# Patient Record
Sex: MALE | Race: BLACK OR AFRICAN AMERICAN
[De-identification: names, ages, dates, MRNs, and addresses within clinical notes are randomized per-mention and may not be internally consistent; named-entity substitution may affect disease eponyms.]

---

## 2018-07-01 NOTE — OP
PREOPERATIVE DIAGNOSES:  Right submandibular abscess, right sublingual abscess, necrotic tooth #32.

 

PROCEDURE PERFORMED:  Surgical removal of tooth #32, incision and drainage of right submandibular and
 sublingual abscess.

 

SPECIMENS REMOVED:  None.

 

ESTIMATED BLOOD LOSS:  15-20 mL.

 

URINE OUTPUT:  Not recorded.

 

DISPOSITION:  The patient tolerated the procedure well.

 

BRIEF PATIENT HISTORY AND PROCEDURE IN DETAIL:  This is a 33-year-old male with a history of right lo
wer jaw pain and tooth pain and swelling, taken to the OR, prepped and draped in the sterile fashion.
  A throat pack was placed, mouth irrigated with Peridex.  Hockey stick incision over bone was made o
johnna a necrotic tooth #32.  This tooth crown was fractured off and tissue was grown over the top of th
e tooth.  A full thickness mucoperiosteal flap to the buccal was made.  A small amount of buccal oste
ctomy was done with the drill followed by sectioning of the tooth with the drill, elevator removal of
 the tooth.  I curetted the socket, normal saline irrigation of the socket.  A lingual degloving full
 thickness mucoperiosteal flap was laid to the lingual of the mandible, into the sublingual space and
 down the submandibular space.  Mild amount of purulent type exudate was expressed.  Area was irrigat
ed thoroughly.  A quarter-inch Penrose drain was placed.  Area left open to drain.

 

ANESTHESIA:  General endotracheal anesthesia through oral tube.  The patient was extubated stable and
 transferred to the postop recovery unit.

## 2018-07-01 NOTE — CON
DATE OF CONSULTATION:  07/01/2018

 

REASON FOR CONSULTATION:  Jaw pain and swelling.

 

HISTORY OF PRESENT ILLNESS:  This is a 33-year-old black male with approximately 2-week history of a 
right lower jaw pain and swelling, has been in the ER multiple times, recently on penicillin for this
 right lower jaw pain and swelling.  For this, I was consulted.  The patient does have a little bit o
f difficulty swallowing and pain in his right jaw.  No difficulty breathing or maintaining his airway
 or holding secretions.  He has no numbness of his lip, chin or tongue.  He does complain of pain in 
the right submandibular and submental region and pain from a right posterior mandibular molar.

 

PAST MEDICAL HISTORY:  The patient has a history of psychiatric disorder, unspecified.

 

PAST SURGICAL HISTORY:  Bowel resection as a child.

 

SOCIAL HISTORY:  Denies alcohol, tobacco and drugs.

 

ALLERGIES:  To RITALIN.

 

MEDICATIONS:  The patient states he takes no current home meds.

 

PHYSICAL EXAMINATION:

VITAL SIGNS:  The patient's vital signs are currently stable.  He is afebrile, he is satting 96% on r
oom air.

GENERAL:  He is awake, alert, oriented x3, is in no acute distress.

NECK:  He does have a rather obese neck with some swelling in the right submandibular and submental a
gaston as well as some swelling in the floor of the mouth on the right side.

HEENT:  He has a grossly decayed tooth #32, which is tender to palpation.  His oral opening is good. 
 His oropharynx is visualized.  His floor of mouth is slightly elevated, but is fairly soft, nonfluct
uant as well as submandibular and submental area; however, it is very tender to palpation.

 

A CT scan of the neck with IV contrast shows a possible enhancing abscess lesion in the right subling
ual area with attenuation of the right submental area as well.  I cannot for certain say this the act
ual fluid collection or just inflamed tissue, there is also noted a necrotic tooth #32 with a periapi
talib radiolucency with cortical perforation of that area.

 

ASSESSMENT:  This is a 33-year-old male with right submandibular, submental cellulitis, possible absc
ess from necrotic tooth #32 with periapical abscess.

 

PLAN:  Will be keep the patient on IV antibiotics, clindamycin 900 mg IV q.6 hours is fine for now.  
The patient has also been given meropenem in the ER.  We will take the patient to the OR for drainage
 of the above noted abscess, right submandibular and sublingual area and surgical removal and debride
ment of socket #32.  We will follow the patient closely.

## 2018-07-01 NOTE — CT
CT FACE WITH CONTRAST:

 

INDICATIONS:

Swelling.  Low mandible with difficulty breathing and throat swelling.

 

TECHNIQUE:

Multiple axial tomograms obtained through the face, to the mid neck region.

 

FINDINGS:

The facial bones show no evidence of nasal or orbital abnormality.  The paranasal sinuses are well ae
rated with no mucosal disease.  The maxilla, zygoma, and mandible appear unremarkable.  The mastoid a
nd petrous air cells are well aerated.  The soft tissues show unremarkable parotid and submandibular 
glands.

 

Nasopharynx unremarkable.

 

Waldeyer's ring and oropharynx are unremarkable.

 

Review of the base of the tongue reveals abnormal low attenuation involving the base of the tongue on
 the right, in the region of the geniohyoid musculature, just anterior to the mylohyoid.  This has so
mewhat of a linear appearance, measuring up to 2.2 cm in AP dimension in the axial plane x approximat
ely 0.7 cm in width.  There is extension, however, to the lingual side of the mandible, and there is 
evidence of erosion to the cortex of the mandible, involving the third molar.  There is a large attila
 involving this molar, and there is evidence of abnormal cystic change involving the roots.  This jacobson
ses the possibility of a periapical abscess with lingular extension into the floor of the mouth.  Oth
er considerations include a dilated Ivel's duct, although the dental etiology is favored.  There i
s no evidence of a Cassia's duct stone, and the right submandibular gland does not appear inflamed.

 

Nonspecific lymph nodes in the cervical chains bilaterally with prominent level II lymph nodes bilate
rally, and there are mildly prominent level I lymph nodes, measuring up to 1 cm.

 

IMPRESSION:

Abnormal area of low attenuation in the sublingual region on the right with some surrounding enhancem
ent.  There is cortical erosion involving the lingual side of the mandible at this site, and there is
 an abnormality involving the third molar.  A periapical abscess with lingular extension into the luba
or of the mouth is suspected.  Recommend either a ENT or oral surgical consultation.

 

POS: [facility]

## 2018-07-01 NOTE — HP
DATE OF SERVICE:  2018

 

CHIEF COMPLAINT:  "Woke up with swelling."

 

PRIMARY CARE PROVIDER:  None.

 

HISTORY OF PRESENT ILLNESS:  Mr. Tracey is a 33-year-old male, who presents to 
the emergency department with worsening pain and swelling in his jaw and neck.  
He was seen in the emergency room twice over the past few days.  On the first 
visit he was treated with toradol, oral penicillin, peridex and ibuprofen.  On 
the second visit yesterday he was diagnosed with acute pharyngitis, and given 
an injection of Bicillin and 8 mg of oral dexamethasone.  He returns today with 
worsening both of the swelling in his jaw and neck, fevers.  He reports he is 
unable to eat or drink fluids because of this.  He denies any precipitants or 
relieving factors, denies any recent dental work, states the last time was when 
he was in custodial back in .  

 

In the emergency room, the patient diagnosed with Carlos's angina, given 
meropenem 1 gram IV, clindamycin 900 mg IV, Decadron 10 mg IV and 1 liter of 
normal saline and OMFS called for evaluation and surgery and Hospitalist called 
for admission.

 

PAST MEDICAL HISTORY:  Strabismus of his right eye, history of a prior 
admission in  for facial swelling and dental pain with a diagnosis of oral 
abscess.

 

PAST SURGICAL HISTORY:  He reports some type of abdominal surgery as an infant.

 

SOCIAL HISTORY:  He denies any tobacco, alcohol, or drugs.  He lives locally 
with his girlfriend.  He reports his surrogate decision maker is his aunt 
Melissa Faulkner.

 

FAMILY HISTORY:  Significant for mom who passed away of cirrhosis.

 

REVIEW OF SYSTEMS:  He denies any problems with his vision, nausea, vomiting, 
chest pain or shortness of breath.  All remaining review of systems are 
reviewed and negative.

 

ALLERGIES TO MEDICATIONS:  RITALIN.

 

CURRENT MEDICATIONS:  As prescribed on 2018,

1.  Pen-Vee K 500 mg 4 times daily.

2.  Peridex 4 times daily.

3.  Ibuprofen 800 mg every 8 hours as needed.

 

PHYSICAL EXAMINATION:

VITAL SIGNS:  Blood pressure 143/86, pulse 81, respirations 32, although the 
patient does not appear tachypneic.  Temperature 99.2, saturation 94% on room 
air.

GENERAL:  Awake, alert, responsive, in no apparent distress, although he 
speaking with a muffled voice.

HEENT:  Right strabismus laterally.  Pupils are equal and round.  The patient 
is unable to open his mouth very wide.  He has edema in the submandibular area 
on the right side with tenderness to palpation and palpable fullness.

LYMPHATICS:  No palpable lymphadenopathy, although unable to completely assess 
in the right anterior cervical area.  No bilateral supraclavicular 
lymphadenopathy.

LUNGS:  Clear to auscultation bilateral.  No audible wheezing, rhonchi or rales.

HEART:  Normal S1, S2, regular rate and rhythm, no audible murmurs.

ABDOMEN:  Soft.  Present bowel sounds.  Nontender, nondistended.

EXTREMITIES:  No clubbing, cyanosis, or edema.

SKIN:  No visible rashes.

 

LABORATORY DATA AND IMAGIN.  CBC:  11.9, 13,6, 40.1, 216.

2.  Chemistry:  141, 3.5, 104, 27, 13, 0.9, 88.

3.  LFTs are normal.

4.  The patient had a facial bone CT and the results are not yet available.

 

IMPRESSION:  

1.  Carlos's angina.

 2.  Strabismus - asymptomatic



PLAN:

1.  Evaluation by OMFS with intention for the operating room for a definitive 
treatment and cultures.

2.  I discussed with OMFS antibiotics, we will continue the clindamycin at 900 
mg every 6 hours.  Cultures to be obtained in the OR.

3.  Manage pain and continue IV fluids for hydration.

4.  Anticipated length of stay is at least 2 midnights for IV antibiotics, 
determination of antibiotic therapy to transition to, and any further needs.

5.  DVT prophylaxis with pneumatic compression devices, anticipate patient will 
be ambulatory.

6.  Gastrointestinal prophylaxis not indicated.

7.  Code status is FULL.  Surrogate decision maker is the patient's aunt.

 

I reviewed the plan of care with the patient.  No questions or further needs at 
end of evaluation.

 

[facility]

## 2018-07-02 NOTE — PDOC.PN
- Subjective


Encounter Start Date: 07/02/18


Encounter Start Time: 22:10


Subjective: f/u for R sublingual abscess s/p I&D with extraction of tooth #32


-: Feels sore but overall better. Tx with IV Clindamycin currently.





- Objective


MAR Reviewed: Yes


Vital Signs & Weight: 


 Vital Signs (12 hours)











  Temp Pulse Resp BP Pulse Ox


 


 07/02/18 19:45  98.3 F  82  16  131/81  94 L


 


 07/02/18 15:10  97.5 F L  80  18  130/80  98


 


 07/02/18 12:00  97.8 F  69  18  130/85  97








 Weight











Weight                         290 lb














I&O: 


 











 07/01/18 07/02/18 07/03/18





 06:59 06:59 06:59


 


Intake Total  1840 836


 


Output Total  950 


 


Balance  890 836











Result Diagrams: 


 07/02/18 05:08





 07/02/18 05:08


Additional Labs: 





Microbiology





07/08/15 08:26   Throat - Pending   Group A Streptococcus Screen ([facility]) - Final


06/30/18 16:33   Throat - Pending   Group A Streptococcus Culture - Final


06/30/18 16:24   Tonsil - Pending   Group A Streptococcus Screen ([facility]) - Final


07/01/18 13:49   Venous blood - Left Hand   Blood Culture - Preliminary


                              Specimen has been received and culture in 

progress.


                              No Growth to date.


07/01/18 13:30   Venous blood - Left Arm   Blood Culture - Preliminary


                              Specimen has been received and culture in 

progress.


                              No Growth to date.





 Laboratory Tests











  07/01/18 07/02/18





  13:29 05:08


 


WBC  11.9 H 


 


Neutrophils %   86.6 H


 


Neutrophils % (Manual)  77 H 











Radiology Reviewed by me: Yes (CT neck - R sublingual abscess and periapical 

abscess with extension)





Phys Exam





- Physical Examination


Constitutional: NAD


Edema and TTP of R submandibular region, anterior R cervical area


HEENT: PERRLA, sclera anicteric


+ cervical adenopathy


Neck: no JVD, full ROM


Respiratory: no wheezing, no rales, no rhonchi, clear to auscultation bilateral


S1, S2


Cardiovascular: RRR, no significant murmur, no rub, gallop


Gastrointestinal: soft, non-tender, no distention, positive bowel sounds


Musculoskeletal: no edema, pulses present


Neurological: non-focal, normal sensation, moves all 4 limbs


Psychiatric: normal affect, A&O x 3


Skin: no rash, normal turgor, cap refill <2 seconds





Dx/Plan


(1) Sublingual abscess


Code(s): K12.2 - CELLULITIS AND ABSCESS OF MOUTH   Status: Acute   Comment: s/p 

I&D of abscess POD #1, continue Clindamycin 900mg IV q6h, Chlorhexidine SSP, 

pain control prn   





(2) Periapical abscess


Code(s): K04.7 - PERIAPICAL ABSCESS WITHOUT SINUS   Status: Acute   Comment: s/

p #32 extraction, see above in #1   





(3) Neutrophilic leukocytosis


Code(s): D72.9 - DISORDER OF WHITE BLOOD CELLS, UNSPECIFIED   Status: Acute   

Comment: Serial CBC monitoring   





- Plan


plan discussed w/ family, continue antibiotics, out of bed/ambulate, DVT proph w

/SCDs


Stable overall


-: Continue Clindamycin 900mg IV q6h 


-: Continue Morphine Sulfate 2mg-4mg IV q4h prn pain


-: Chlorhexidine SSP


-: AM lab: BMP, CBC





* .

## 2018-07-03 NOTE — PRG
DATE OF SERVICE:  07/02/2018

 

TIME:  06:30 p.m.

 

SUBJECTIVE:  No acute 24-hour events.  The patient is voiding, ambulating, and tolerating p.o.  His p
ain is controlled.  No dysphagia.  No difficulty breathing.

 

OBJECTIVE:

VITAL SIGNS:  Stable.  He is afebrile, T-max is 98.7.

GENERAL:  He is awake, alert, oriented x3.  He is in no acute distress.

HEENT:  His oral opening is good.  His oropharynx is clear.  There is no uvular deviation.  The floor
 of mouth is soft.

NECK:  Soft.  There is decreased swelling of the right submandibular and submental areas.  The drain 
Penrose 1/2 inch was removed from the lingual of the mandible today.

 

LABORATORY DATA:  White blood cell count today is 12.4.  This is being dictated on 07/03/2018 where t
he white count is 8.2.

 

ASSESSMENT:  The patient is doing well, status post I and D of a dental abscess.

 

PLAN:  Discharge the patient home on p.o. clindamycin 300 mg p.o. q.6 hours x5 days, Peridex 15 mL sw
sharona and spit t.i.d., and Tylenol #3 for pain.

## 2018-07-04 NOTE — DIS
DATE OF ADMISSION:  07/01/2018

 

DATE OF DISCHARGE:  07/03/2018

 

DISCHARGE DIAGNOSES:

1.  Sublingual abscess status post incision and drainage on 07/01/2018.

2.  Periapical abscess of tooth #32, status post extraction.

3.  Neutrophilic leukocytosis improved.

 

CONSULTATIONS:  Dr. Humphrey with Oral Maxillofacial Surgery Service.

 

PERTINENT LABORATORY AND X-RAY FINDINGS:  Complete metabolic profile within normal limits.  CBC showe
d a white blood cell count ranged between 8.2 to 12.4, hemoglobin ranged between 12.3 to 13.6.  Blood
 cultures x2 from 07/01/2018 showed no growth at 48 hours.  CT of the facial bone dated 07/01/2018 sh
owed cortical erosion of the lingual side of the mandible the sublingual region on the right.  Abnorm
ality involving the third molar noted with concern for periapical abscess.

 

HOSPITAL COURSE:  The patient was initially admitted after presenting with jaw and neck pain in the c
ontext of abscess in the sublingual region noted on CT imaging of the face and neck.  The patient was
 initially treated with IV meropenem, clindamycin, and Decadron and 1 liter of normal saline.  The jeanette killian underwent evaluation by the Oral Maxillofacial Surgery Service and taken for incision and drain
age of the right submandibular and sublingual abscess as well as extraction of tooth #32.  The patien
t continued on IV clindamycin throughout the hospital course; however, postoperatively remained clini
adalgisa stable.  The patient received Peridex swish and spit as well as ibuprofen and morphine sulfate 
for pain control.  The patient overall remained clinically stable throughout the hospital course and 
I have discussed followup instructions with the patient who verbalized understanding and agreement.  
The patient overall clinically stable and ready for discharge on 07/03/2018.

 

DISCHARGE MEDICATIONS:

1.  Clindamycin 300 mg 1 tab p.o. q.6 hours x5 days.

2.  Peridex 15 mL swish and spit t.i.d.

3.  Tylenol No. 3 one to two tabs p.o. q.4-6 hours p.r.n.

 

FOLLOWUP:  The patient will follow up with Dr. Rosales Humphrey with Oral Maxillofacial Surgery [facility].

 

CONDITION ON DISCHARGE:  Stable.

 

ACTIVITY:  Ad shameka.

 

DIET:  Regular.

 

CODE STATUS:  FULL.

 

DISPOSITION:  Home on 07/03/2018.

## 2020-03-03 NOTE — RAD
THREE VIEWS OF THE LEFT SHOULDER:

3/3/20

 

COMPARISON: 

None.

 

HISTORY: 

Injury, trauma, pain. 

 

FINDINGS: 

There is no widening of the acromioclavicular or the coracoclavicular interspace. No displaced fractu
re or evidence of dislocation is appreciate. 

 

There is a focal area of linear calcification adjacent to the greater tuberosity on image 1 of 3 suad
uring 7 mm, which may be on the basis of calcific tendinosis. 

 

IMPRESSION: 

No displaced fracture or evidence of dislocation. Question calcific tendinosis. 

 

POS: [facility]

## 2021-02-21 NOTE — RAD
Portable frontal chest radiograph:

2/21/2021



COMPARISON: 6/13/2015



HISTORY: Fever and chills, cough



FINDINGS: There are patchy areas of interstitial and groundglass opacity noted in the left perihilar 
region and left suprahilar region as well as within the medial right lung base. There is no

pneumothorax or pleural fluid and no focal consolidation or alveolar edema.



IMPRESSION: Patchy areas of linear interstitial density and groundglass opacity. Findings may signify
 Covid pneumonia in the proper clinical setting.



Reported By: Gregory Helton 

Electronically Signed:  2/21/2021 4:47 PM

## 2021-08-04 ENCOUNTER — HOSPITAL ENCOUNTER (EMERGENCY)
Dept: HOSPITAL 92 - ERS | Age: 36
Discharge: HOME | End: 2021-08-04
Payer: COMMERCIAL

## 2021-08-04 DIAGNOSIS — E78.5: ICD-10-CM

## 2021-08-04 DIAGNOSIS — K59.00: Primary | ICD-10-CM

## 2021-08-04 DIAGNOSIS — I10: ICD-10-CM

## 2021-08-04 LAB
ALBUMIN SERPL BCG-MCNC: 4.3 G/DL (ref 3.5–5)
ALP SERPL-CCNC: 73 U/L (ref 40–110)
ALT SERPL W P-5'-P-CCNC: 27 U/L (ref 8–55)
ANION GAP SERPL CALC-SCNC: 13 MMOL/L (ref 10–20)
AST SERPL-CCNC: 18 U/L (ref 5–34)
BASOPHILS # BLD AUTO: 0 THOU/UL (ref 0–0.2)
BASOPHILS NFR BLD AUTO: 0.3 % (ref 0–1)
BILIRUB SERPL-MCNC: 0.5 MG/DL (ref 0.2–1.2)
BUN SERPL-MCNC: 8 MG/DL (ref 8.9–20.6)
CALCIUM SERPL-MCNC: 9.5 MG/DL (ref 7.8–10.44)
CHLORIDE SERPL-SCNC: 102 MMOL/L (ref 98–107)
CO2 SERPL-SCNC: 26 MMOL/L (ref 22–29)
CREAT CL PREDICTED SERPL C-G-VRATE: 0 ML/MIN (ref 70–130)
EOSINOPHIL # BLD AUTO: 0 THOU/UL (ref 0–0.7)
EOSINOPHIL NFR BLD AUTO: 0.5 % (ref 0–10)
GLOBULIN SER CALC-MCNC: 3.7 G/DL (ref 2.4–3.5)
GLUCOSE SERPL-MCNC: 83 MG/DL (ref 70–105)
HGB BLD-MCNC: 13.6 G/DL (ref 14–18)
LIPASE SERPL-CCNC: 12 U/L (ref 8–78)
LYMPHOCYTES # BLD: 1.6 THOU/UL (ref 1.2–3.4)
LYMPHOCYTES NFR BLD AUTO: 19.1 % (ref 21–51)
MCH RBC QN AUTO: 30.4 PG (ref 27–31)
MCV RBC AUTO: 90.8 FL (ref 78–98)
MONOCYTES # BLD AUTO: 0.6 THOU/UL (ref 0.11–0.59)
MONOCYTES NFR BLD AUTO: 6.8 % (ref 0–10)
NEUTROPHILS # BLD AUTO: 6.3 THOU/UL (ref 1.4–6.5)
NEUTROPHILS NFR BLD AUTO: 73.4 % (ref 42–75)
PLATELET # BLD AUTO: 268 THOU/UL (ref 130–400)
POTASSIUM SERPL-SCNC: 4.2 MMOL/L (ref 3.5–5.1)
RBC # BLD AUTO: 4.46 MILL/UL (ref 4.7–6.1)
SODIUM SERPL-SCNC: 137 MMOL/L (ref 136–145)
SP GR UR STRIP: 1.02 (ref 1–1.04)
WBC # BLD AUTO: 8.5 THOU/UL (ref 4.8–10.8)

## 2021-08-04 PROCEDURE — 74019 RADEX ABDOMEN 2 VIEWS: CPT

## 2021-08-04 PROCEDURE — 83690 ASSAY OF LIPASE: CPT

## 2021-08-04 PROCEDURE — 85025 COMPLETE CBC W/AUTO DIFF WBC: CPT

## 2021-08-04 PROCEDURE — 81003 URINALYSIS AUTO W/O SCOPE: CPT

## 2021-08-04 PROCEDURE — 80053 COMPREHEN METABOLIC PANEL: CPT

## 2021-08-04 PROCEDURE — 36415 COLL VENOUS BLD VENIPUNCTURE: CPT

## 2021-09-30 ENCOUNTER — HOSPITAL ENCOUNTER (EMERGENCY)
Dept: HOSPITAL 92 - ERS | Age: 36
Discharge: HOME | End: 2021-09-30
Payer: COMMERCIAL

## 2021-09-30 DIAGNOSIS — K52.9: Primary | ICD-10-CM

## 2021-09-30 LAB
ALBUMIN SERPL BCG-MCNC: 4.1 G/DL (ref 3.5–5)
ALP SERPL-CCNC: 66 U/L (ref 40–110)
ALT SERPL W P-5'-P-CCNC: 21 U/L (ref 8–55)
ANION GAP SERPL CALC-SCNC: 13 MMOL/L (ref 10–20)
AST SERPL-CCNC: 15 U/L (ref 5–34)
BASOPHILS # BLD AUTO: 0 THOU/UL (ref 0–0.2)
BASOPHILS NFR BLD AUTO: 1 % (ref 0–1)
BILIRUB SERPL-MCNC: 0.5 MG/DL (ref 0.2–1.2)
BUN SERPL-MCNC: 9 MG/DL (ref 8.9–20.6)
CALCIUM SERPL-MCNC: 9.4 MG/DL (ref 7.8–10.44)
CHLORIDE SERPL-SCNC: 108 MMOL/L (ref 98–107)
CO2 SERPL-SCNC: 25 MMOL/L (ref 22–29)
CREAT CL PREDICTED SERPL C-G-VRATE: 0 ML/MIN (ref 70–130)
EOSINOPHIL # BLD AUTO: 0.1 THOU/UL (ref 0–0.7)
EOSINOPHIL NFR BLD AUTO: 1.1 % (ref 0–10)
GLOBULIN SER CALC-MCNC: 3.2 G/DL (ref 2.4–3.5)
GLUCOSE SERPL-MCNC: 103 MG/DL (ref 70–105)
HGB BLD-MCNC: 12.8 G/DL (ref 14–18)
LYMPHOCYTES # BLD: 1.2 THOU/UL (ref 1.2–3.4)
LYMPHOCYTES NFR BLD AUTO: 24.2 % (ref 21–51)
MCH RBC QN AUTO: 29.9 PG (ref 27–31)
MCV RBC AUTO: 91.1 FL (ref 78–98)
MONOCYTES # BLD AUTO: 0.4 THOU/UL (ref 0.11–0.59)
MONOCYTES NFR BLD AUTO: 7.8 % (ref 0–10)
NEUTROPHILS # BLD AUTO: 3.1 THOU/UL (ref 1.4–6.5)
NEUTROPHILS NFR BLD AUTO: 66 % (ref 42–75)
PLATELET # BLD AUTO: 244 THOU/UL (ref 130–400)
POTASSIUM SERPL-SCNC: 4.2 MMOL/L (ref 3.5–5.1)
RBC # BLD AUTO: 4.28 MILL/UL (ref 4.7–6.1)
SODIUM SERPL-SCNC: 142 MMOL/L (ref 136–145)
WBC # BLD AUTO: 4.8 THOU/UL (ref 4.8–10.8)

## 2021-09-30 PROCEDURE — 80053 COMPREHEN METABOLIC PANEL: CPT

## 2021-09-30 PROCEDURE — 36415 COLL VENOUS BLD VENIPUNCTURE: CPT

## 2021-09-30 PROCEDURE — 94760 N-INVAS EAR/PLS OXIMETRY 1: CPT

## 2021-09-30 PROCEDURE — 85025 COMPLETE CBC W/AUTO DIFF WBC: CPT

## 2021-10-23 ENCOUNTER — HOSPITAL ENCOUNTER (EMERGENCY)
Dept: HOSPITAL 92 - ERS | Age: 36
Discharge: HOME | End: 2021-10-23
Payer: COMMERCIAL

## 2021-10-23 DIAGNOSIS — I10: ICD-10-CM

## 2021-10-23 DIAGNOSIS — E78.5: ICD-10-CM

## 2021-10-23 DIAGNOSIS — M25.561: Primary | ICD-10-CM

## 2021-10-23 PROCEDURE — 96372 THER/PROPH/DIAG INJ SC/IM: CPT

## 2022-02-12 ENCOUNTER — HOSPITAL ENCOUNTER (EMERGENCY)
Dept: HOSPITAL 92 - ERS | Age: 37
Discharge: HOME | End: 2022-02-12
Payer: COMMERCIAL

## 2022-02-12 DIAGNOSIS — E78.5: ICD-10-CM

## 2022-02-12 DIAGNOSIS — M62.838: ICD-10-CM
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
## 2022-02-12 DIAGNOSIS — M54.50: ICD-10-CM

## 2022-02-12 DIAGNOSIS — M54.2: ICD-10-CM

## 2022-02-12 DIAGNOSIS — S30.1XXA: Primary | ICD-10-CM

## 2022-02-12 DIAGNOSIS — I10: ICD-10-CM

## 2022-02-12 PROCEDURE — 74177 CT ABD & PELVIS W/CONTRAST: CPT

## 2022-02-12 PROCEDURE — 72125 CT NECK SPINE W/O DYE: CPT

## 2022-02-12 PROCEDURE — 71045 X-RAY EXAM CHEST 1 VIEW: CPT

## 2022-12-04 ENCOUNTER — HOSPITAL ENCOUNTER (EMERGENCY)
Dept: HOSPITAL 92 - ERS | Age: 37
Discharge: HOME | End: 2022-12-04
Payer: COMMERCIAL

## 2022-12-04 DIAGNOSIS — I10: ICD-10-CM

## 2022-12-04 DIAGNOSIS — B34.9: ICD-10-CM

## 2022-12-04 DIAGNOSIS — Z20.822: ICD-10-CM

## 2022-12-04 DIAGNOSIS — E78.5: ICD-10-CM

## 2022-12-04 DIAGNOSIS — H60.92: Primary | ICD-10-CM

## 2022-12-04 PROCEDURE — 99283 EMERGENCY DEPT VISIT LOW MDM: CPT

## 2023-01-22 ENCOUNTER — HOSPITAL ENCOUNTER (EMERGENCY)
Dept: HOSPITAL 92 - ERS | Age: 38
Discharge: HOME | End: 2023-01-22
Payer: COMMERCIAL

## 2023-01-22 DIAGNOSIS — E78.5: ICD-10-CM

## 2023-01-22 DIAGNOSIS — M79.602: Primary | ICD-10-CM

## 2023-01-22 DIAGNOSIS — I10: ICD-10-CM

## 2023-01-22 LAB
ALBUMIN SERPL BCG-MCNC: 4.4 G/DL (ref 3.5–5)
ALP SERPL-CCNC: 62 U/L (ref 40–110)
ALT SERPL W P-5'-P-CCNC: 26 U/L (ref 8–55)
ANION GAP SERPL CALC-SCNC: 15 MMOL/L (ref 10–20)
AST SERPL-CCNC: 17 U/L (ref 5–34)
BASOPHILS # BLD AUTO: 0.1 THOU/UL (ref 0–0.2)
BASOPHILS NFR BLD AUTO: 1 % (ref 0–1)
BILIRUB SERPL-MCNC: 0.3 MG/DL (ref 0.2–1.2)
BUN SERPL-MCNC: 10 MG/DL (ref 8.9–20.6)
CALCIUM SERPL-MCNC: 9.4 MG/DL (ref 7.8–10.44)
CHLORIDE SERPL-SCNC: 108 MMOL/L (ref 98–107)
CO2 SERPL-SCNC: 23 MMOL/L (ref 22–29)
CREAT CL PREDICTED SERPL C-G-VRATE: 0 ML/MIN (ref 70–130)
EOSINOPHIL # BLD AUTO: 0 THOU/UL (ref 0–0.7)
EOSINOPHIL NFR BLD AUTO: 0.8 % (ref 0–10)
GLOBULIN SER CALC-MCNC: 3.6 G/DL (ref 2.4–3.5)
GLUCOSE SERPL-MCNC: 79 MG/DL (ref 70–105)
HGB BLD-MCNC: 14.3 G/DL (ref 14–18)
LYMPHOCYTES # BLD: 1.7 THOU/UL (ref 1.2–3.4)
LYMPHOCYTES NFR BLD AUTO: 31.4 % (ref 21–51)
MCH RBC QN AUTO: 30.9 PG (ref 27–31)
MCV RBC AUTO: 90.2 FL (ref 78–98)
MONOCYTES # BLD AUTO: 0.5 THOU/UL (ref 0.11–0.59)
MONOCYTES NFR BLD AUTO: 8.9 % (ref 0–10)
NEUTROPHILS # BLD AUTO: 3.1 THOU/UL (ref 1.4–6.5)
NEUTROPHILS NFR BLD AUTO: 57.9 % (ref 42–75)
PLATELET # BLD AUTO: 256 10X3/UL (ref 130–400)
POTASSIUM SERPL-SCNC: 4.1 MMOL/L (ref 3.5–5.1)
RBC # BLD AUTO: 4.64 MILL/UL (ref 4.7–6.1)
SODIUM SERPL-SCNC: 142 MMOL/L (ref 136–145)
WBC # BLD AUTO: 5.4 10X3/UL (ref 4.8–10.8)

## 2023-01-22 PROCEDURE — 93005 ELECTROCARDIOGRAM TRACING: CPT

## 2023-01-22 PROCEDURE — 84484 ASSAY OF TROPONIN QUANT: CPT

## 2023-01-22 PROCEDURE — 80053 COMPREHEN METABOLIC PANEL: CPT

## 2023-01-22 PROCEDURE — 70450 CT HEAD/BRAIN W/O DYE: CPT

## 2023-01-22 PROCEDURE — 96372 THER/PROPH/DIAG INJ SC/IM: CPT

## 2023-01-22 PROCEDURE — 85025 COMPLETE CBC W/AUTO DIFF WBC: CPT

## 2023-01-22 PROCEDURE — 36415 COLL VENOUS BLD VENIPUNCTURE: CPT

## 2023-01-22 PROCEDURE — 71045 X-RAY EXAM CHEST 1 VIEW: CPT

## 2024-10-28 ENCOUNTER — HOSPITAL ENCOUNTER (EMERGENCY)
Dept: HOSPITAL 92 - ERS | Age: 39
LOS: 1 days | Discharge: HOME | End: 2024-10-29
Payer: SELF-PAY

## 2024-10-28 DIAGNOSIS — G62.9: Primary | ICD-10-CM

## 2024-10-28 DIAGNOSIS — I10: ICD-10-CM

## 2024-10-28 LAB
ALBUMIN SERPL BCG-MCNC: 3.8 G/DL (ref 3.5–5)
ALP SERPL-CCNC: 66 U/L (ref 40–110)
ALT SERPL W P-5'-P-CCNC: 34 U/L (ref 8–55)
ANION GAP SERPL CALC-SCNC: 13 MMOL/L (ref 10–20)
APAP SERPL-MCNC: (no result) MCG/ML (ref ?–10)
AST SERPL-CCNC: 23 U/L (ref 5–34)
BASOPHILS # BLD AUTO: 0.03 10X3/UL (ref 0–0.2)
BASOPHILS NFR BLD AUTO: 0.8 % (ref 0–1)
BILIRUB SERPL-MCNC: 0.4 MG/DL (ref 0.2–1.2)
BUN SERPL-MCNC: 9 MG/DL (ref 8.9–20.6)
CALCIUM SERPL-MCNC: 9.3 MG/DL (ref 7.8–10.44)
CHLORIDE SERPL-SCNC: 103 MMOL/L (ref 98–107)
CO2 SERPL-SCNC: 25 MMOL/L (ref 22–29)
CREAT CL PREDICTED SERPL C-G-VRATE: 0 ML/MIN (ref 70–130)
EOSINOPHIL # BLD AUTO: 0.1 10X3/UL (ref 0–0.7)
EOSINOPHIL NFR BLD AUTO: 2.3 % (ref 0–10)
GLOBULIN SER CALC-MCNC: 3.6 G/DL (ref 2.4–3.5)
GLUCOSE SERPL-MCNC: 98 MG/DL (ref 70–105)
HCT VFR BLD CALC: 42.1 % (ref 42–52)
HGB BLD-MCNC: 13.9 G/DL (ref 14–18)
LYMPHOCYTES NFR BLD AUTO: 34.4 % (ref 21–51)
MCH RBC QN AUTO: 29.6 PG (ref 27–31)
MCV RBC AUTO: 89.8 FL (ref 78–98)
MONOCYTES # BLD AUTO: 0.3 10X3/UL (ref 0.11–0.59)
MONOCYTES NFR BLD AUTO: 7.6 % (ref 0–10)
NEUTROPHILS # BLD AUTO: 2.2 10X3/UL (ref 1.4–6.5)
NEUTROPHILS NFR BLD AUTO: 54.6 % (ref 42–75)
PLATELET # BLD AUTO: 247 10X3/UL (ref 130–400)
POTASSIUM SERPL-SCNC: 4 MMOL/L (ref 3.5–5.1)
RBC # BLD AUTO: 4.69 MILL/UL (ref 4.7–6.1)
SALICYLATES SERPL-MCNC: (no result) MG/DL (ref ?–8)
SODIUM SERPL-SCNC: 137 MMOL/L (ref 136–145)
TROPONIN I SERPL DL<=0.01 NG/ML-MCNC: (no result) NG/ML (ref ?–0.03)
WBC # BLD AUTO: 4 10X3/UL (ref 4.8–10.8)

## 2024-10-28 PROCEDURE — 36415 COLL VENOUS BLD VENIPUNCTURE: CPT

## 2024-10-28 PROCEDURE — 80306 DRUG TEST PRSMV INSTRMNT: CPT

## 2024-10-28 PROCEDURE — 80053 COMPREHEN METABOLIC PANEL: CPT

## 2024-10-28 PROCEDURE — 93005 ELECTROCARDIOGRAM TRACING: CPT

## 2024-10-28 PROCEDURE — 84484 ASSAY OF TROPONIN QUANT: CPT

## 2024-10-28 PROCEDURE — 83605 ASSAY OF LACTIC ACID: CPT

## 2024-10-28 PROCEDURE — 87428 SARSCOV & INF VIR A&B AG IA: CPT

## 2024-10-28 PROCEDURE — 71045 X-RAY EXAM CHEST 1 VIEW: CPT

## 2024-10-28 PROCEDURE — 80307 DRUG TEST PRSMV CHEM ANLYZR: CPT

## 2024-10-28 PROCEDURE — 36416 COLLJ CAPILLARY BLOOD SPEC: CPT

## 2024-10-28 PROCEDURE — 70450 CT HEAD/BRAIN W/O DYE: CPT

## 2024-10-28 PROCEDURE — 83880 ASSAY OF NATRIURETIC PEPTIDE: CPT

## 2024-10-28 PROCEDURE — 94760 N-INVAS EAR/PLS OXIMETRY 1: CPT

## 2024-10-28 PROCEDURE — 85025 COMPLETE CBC W/AUTO DIFF WBC: CPT

## 2024-10-29 LAB — DRUG SCREEN CUTOFF: (no result)
